# Patient Record
Sex: MALE | Race: WHITE | NOT HISPANIC OR LATINO
[De-identification: names, ages, dates, MRNs, and addresses within clinical notes are randomized per-mention and may not be internally consistent; named-entity substitution may affect disease eponyms.]

---

## 2019-10-18 PROBLEM — Z00.00 ENCOUNTER FOR PREVENTIVE HEALTH EXAMINATION: Status: ACTIVE | Noted: 2019-10-18

## 2019-12-10 ENCOUNTER — NON-APPOINTMENT (OUTPATIENT)
Age: 74
End: 2019-12-10

## 2019-12-10 ENCOUNTER — APPOINTMENT (OUTPATIENT)
Dept: CARDIOLOGY | Facility: CLINIC | Age: 74
End: 2019-12-10
Payer: MEDICARE

## 2019-12-10 VITALS
WEIGHT: 205 LBS | OXYGEN SATURATION: 96 % | HEIGHT: 68 IN | SYSTOLIC BLOOD PRESSURE: 140 MMHG | BODY MASS INDEX: 31.07 KG/M2 | HEART RATE: 46 BPM | DIASTOLIC BLOOD PRESSURE: 60 MMHG

## 2019-12-10 DIAGNOSIS — R42 DIZZINESS AND GIDDINESS: ICD-10-CM

## 2019-12-10 DIAGNOSIS — Z78.9 OTHER SPECIFIED HEALTH STATUS: ICD-10-CM

## 2019-12-10 DIAGNOSIS — Z82.49 FAMILY HISTORY OF ISCHEMIC HEART DISEASE AND OTHER DISEASES OF THE CIRCULATORY SYSTEM: ICD-10-CM

## 2019-12-10 DIAGNOSIS — Z87.891 PERSONAL HISTORY OF NICOTINE DEPENDENCE: ICD-10-CM

## 2019-12-10 DIAGNOSIS — Z95.5 PRESENCE OF CORONARY ANGIOPLASTY IMPLANT AND GRAFT: ICD-10-CM

## 2019-12-10 PROCEDURE — 93000 ELECTROCARDIOGRAM COMPLETE: CPT

## 2019-12-10 PROCEDURE — 99204 OFFICE O/P NEW MOD 45 MIN: CPT | Mod: 25

## 2019-12-10 NOTE — PHYSICAL EXAM
[General Appearance - Well Developed] : well developed [Normal Appearance] : normal appearance [Well Groomed] : well groomed [General Appearance - Well Nourished] : well nourished [No Deformities] : no deformities [Normal Conjunctiva] : the conjunctiva exhibited no abnormalities [General Appearance - In No Acute Distress] : no acute distress [Normal Oral Mucosa] : normal oral mucosa [Normal Oropharynx] : normal oropharynx [Normal Jugular Venous V Waves Present] : normal jugular venous V waves present [Heart Rate And Rhythm] : heart rate and rhythm were normal [Heart Sounds] : normal S1 and S2 [Arterial Pulses Normal] : the arterial pulses were normal [Edema] : no peripheral edema present [Respiration, Rhythm And Depth] : normal respiratory rhythm and effort [Exaggerated Use Of Accessory Muscles For Inspiration] : no accessory muscle use [Auscultation Breath Sounds / Voice Sounds] : lungs were clear to auscultation bilaterally [Chest Palpation] : palpation of the chest revealed no abnormalities [Lungs Percussion] : the lungs were normal to percussion [Bowel Sounds] : normal bowel sounds [Abdomen Soft] : soft [Abdomen Tenderness] : non-tender [Abdomen Hernia] : no hernia was discovered [Abdomen Mass (___ Cm)] : no abdominal mass palpated [Abnormal Walk] : normal gait [Cyanosis, Localized] : no localized cyanosis [Nail Clubbing] : no clubbing of the fingernails [Skin Turgor] : normal skin turgor [] : no rash [Skin Color & Pigmentation] : normal skin color and pigmentation [Oriented To Time, Place, And Person] : oriented to person, place, and time [Impaired Insight] : insight and judgment were intact [No Anxiety] : not feeling anxious [FreeTextEntry1] : LEONEL 2/6 at the Ao, preserved S2

## 2019-12-10 NOTE — DISCUSSION/SUMMARY
[FreeTextEntry1] : Mr. PARKER HERNANDEZ is a 74 year male with known CAD. Underwent PCI to the RCA in 2002.\par At the present time he is C/O effort related SOB and possible chest pressure.  Had a nuclear stress test that revealed non reversible ischemia in the lateral wall. We discussed the options of doing a cardiac cath. \par I have recommended to continue the same medications.\par We have reviewed his routine laboratory tests\par Routine follow up in 6 months\par

## 2019-12-10 NOTE — REVIEW OF SYSTEMS
[Shortness Of Breath] : shortness of breath [Dyspnea on exertion] : dyspnea during exertion [Palpitations] : palpitations [Nocturia] : nocturia [Skin: A Rash] : rash: [Anxiety] : anxiety [Negative] : Heme/Lymph [Chest Pain] : no chest pain

## 2019-12-10 NOTE — HISTORY OF PRESENT ILLNESS
[FreeTextEntry1] : 75 yo man, , father of two. Retired . In 2002 had a cardiac cath and underwent PCI to the RCA at [facility]. No evidence of past MI. \par In 5/17/2019 underwent a routine nuclear stress test that revealed an EF=51% there was a lateral perfusion abnormality consistent with scar. Has C/O of effort related SOB, however states that he has COPD with nodules in his lung. Occasional chest pressure, not always effort related. NYHA 2/4. Occasional orthopnea? Palpitations? No ankle swelling. \par HTN treated with meds since 2002\par Prediabetes treated with diet \par HLD treated with statin .LDL 50 mg/dl`\par Hypothyroidism for the last 3 years\par Known lung nodules under follow up for the last 3 years\par S/P Rt inguinal hernia repair\par S/P Rt shoulder surgery\par Quit smoking 35 years ago\par Occasional alcohol\par Denies the use of illicit drugs

## 2019-12-10 NOTE — REASON FOR VISIT
[Initial Evaluation] : an initial evaluation of [Spouse] : spouse [FreeTextEntry1] : Had a positive nuclear stress test

## 2019-12-10 NOTE — ASSESSMENT
[FreeTextEntry1] : ECG performed today at the office revealed a NSR, with normal AQRS, GA, QRS and QTc.\par

## 2020-01-28 ENCOUNTER — NON-APPOINTMENT (OUTPATIENT)
Age: 75
End: 2020-01-28

## 2020-01-28 ENCOUNTER — APPOINTMENT (OUTPATIENT)
Dept: CARDIOLOGY | Facility: CLINIC | Age: 75
End: 2020-01-28
Payer: MEDICARE

## 2020-01-28 VITALS
HEIGHT: 67.75 IN | OXYGEN SATURATION: 98 % | BODY MASS INDEX: 32.2 KG/M2 | SYSTOLIC BLOOD PRESSURE: 130 MMHG | DIASTOLIC BLOOD PRESSURE: 71 MMHG | WEIGHT: 210 LBS | HEART RATE: 65 BPM

## 2020-01-28 DIAGNOSIS — R91.8 OTHER NONSPECIFIC ABNORMAL FINDING OF LUNG FIELD: ICD-10-CM

## 2020-01-28 PROCEDURE — 99214 OFFICE O/P EST MOD 30 MIN: CPT | Mod: 24

## 2020-01-28 PROCEDURE — 93000 ELECTROCARDIOGRAM COMPLETE: CPT

## 2020-01-28 RX ORDER — AMLODIPINE BESYLATE 5 MG/1
5 TABLET ORAL DAILY
Qty: 90 | Refills: 3 | Status: ACTIVE | COMMUNITY

## 2020-01-28 RX ORDER — CHOLECALCIFEROL (VITAMIN D3) 25 MCG
TABLET ORAL
Refills: 0 | Status: ACTIVE | COMMUNITY

## 2020-01-28 RX ORDER — MOMETASONE FUROATE MONOHYDRATE 50 UG/1
SPRAY, METERED NASAL
Refills: 0 | Status: ACTIVE | COMMUNITY

## 2020-01-28 RX ORDER — UBIDECARENONE 200 MG
200 CAPSULE ORAL
Refills: 0 | Status: ACTIVE | COMMUNITY

## 2020-01-28 RX ORDER — TRAVOPROST (BENZALKONIUM) 0.004 %
0 DROPS OPHTHALMIC (EYE)
Refills: 0 | Status: ACTIVE | COMMUNITY

## 2020-01-28 RX ORDER — LEVOTHYROXINE SODIUM 0.05 MG/1
50 TABLET ORAL
Refills: 0 | Status: ACTIVE | COMMUNITY

## 2020-01-28 RX ORDER — PANTOPRAZOLE 40 MG/1
40 TABLET, DELAYED RELEASE ORAL
Refills: 0 | Status: ACTIVE | COMMUNITY

## 2020-01-28 RX ORDER — DIPHENOXYLATE HYDROCHLORIDE AND ATROPINE SULFATE 2.5; .025 MG/1; MG/1
TABLET ORAL
Refills: 0 | Status: ACTIVE | COMMUNITY

## 2020-01-28 NOTE — HISTORY OF PRESENT ILLNESS
[FreeTextEntry1] : 75 yo man, , father of two. Retired . In 2002 had a cardiac cath and underwent PCI to the RCA at [facility]. No evidence of past MI. \par In 5/17/2019 underwent a routine nuclear stress test that revealed an EF=51% there was a lateral perfusion abnormality consistent with scar. Has C/O effort related SOB, however states that he has COPD with nodules in his lung. Has to review all the scan with his PCP and lung Dr. Occasional chest pressure, not always effort related. NYHA 2/4. Limited ambulation because of knee problems. Occasional orthopnea? Palpitations? No ankle swelling. \par HTN treated with meds since 2002\par Prediabetes treated with diet \par HLD treated with statin .LDL 50 mg/dl`\par Hypothyroidism for the last 3 years\par Known lung nodules under follow up for the last 3 years. No biopsy was done yet. No change in the last 3 years. \par S/P Rt inguinal hernia repair\par S/P Rt shoulder surgery\par Quit smoking 35 years ago\par Occasional alcohol\par Denies the use of illicit drugs

## 2020-01-28 NOTE — REASON FOR VISIT
[Follow-Up - Clinic] : a clinic follow-up of [Spouse] : spouse [FreeTextEntry1] : Follow up for positive nuclear stress test

## 2020-01-28 NOTE — DISCUSSION/SUMMARY
[FreeTextEntry1] : Mr. PARKER HERNANDEZ is a 74 year male with known CAD. Underwent PCI to the RCA in 2002.\par At the present time he is C/O effort related SOB and possible chest pressure. Had a nuclear stress test in 5/2019 that revealed a medium sized area of moderate severity non reversible ischemia in the basal lateral wall with minimal reversibility. We discussed the options of doing a cardiac cath and now he has agreed. \par I have recommended to continue the same medications.\par We have reviewed his routine laboratory tests\par Routine follow up in 6 months\par

## 2020-01-28 NOTE — REVIEW OF SYSTEMS
[Shortness Of Breath] : shortness of breath [Dyspnea on exertion] : dyspnea during exertion [Nocturia] : nocturia [Skin: A Rash] : rash: [Anxiety] : anxiety [Negative] : Endocrine [Joint Pain] : joint pain [Chest Pain] : no chest pain [Palpitations] : no palpitations

## 2020-01-28 NOTE — PHYSICAL EXAM
[General Appearance - Well Developed] : well developed [Normal Appearance] : normal appearance [Well Groomed] : well groomed [General Appearance - Well Nourished] : well nourished [No Deformities] : no deformities [Normal Conjunctiva] : the conjunctiva exhibited no abnormalities [General Appearance - In No Acute Distress] : no acute distress [Normal Oral Mucosa] : normal oral mucosa [Normal Oropharynx] : normal oropharynx [Normal Jugular Venous V Waves Present] : normal jugular venous V waves present [Exaggerated Use Of Accessory Muscles For Inspiration] : no accessory muscle use [Respiration, Rhythm And Depth] : normal respiratory rhythm and effort [Auscultation Breath Sounds / Voice Sounds] : lungs were clear to auscultation bilaterally [Chest Palpation] : palpation of the chest revealed no abnormalities [Heart Rate And Rhythm] : heart rate and rhythm were normal [Lungs Percussion] : the lungs were normal to percussion [Arterial Pulses Normal] : the arterial pulses were normal [Heart Sounds] : normal S1 and S2 [Edema] : no peripheral edema present [Bowel Sounds] : normal bowel sounds [Abdomen Tenderness] : non-tender [Abdomen Soft] : soft [Abdomen Mass (___ Cm)] : no abdominal mass palpated [Abnormal Walk] : normal gait [Abdomen Hernia] : no hernia was discovered [Cyanosis, Localized] : no localized cyanosis [Nail Clubbing] : no clubbing of the fingernails [Skin Color & Pigmentation] : normal skin color and pigmentation [Skin Turgor] : normal skin turgor [] : no rash [Oriented To Time, Place, And Person] : oriented to person, place, and time [No Anxiety] : not feeling anxious [Impaired Insight] : insight and judgment were intact [FreeTextEntry1] : LEONEL 2/6 at the Ao, preserved S2

## 2020-01-28 NOTE — ASSESSMENT
[FreeTextEntry1] : ECG performed today at the office revealed a NSR 65, with normal AQRS, MO, QRS and QTc. Trigeminy and poor R progression in the precordial leads\par

## 2020-02-13 ENCOUNTER — TRANSCRIPTION ENCOUNTER (OUTPATIENT)
Age: 75
End: 2020-02-13

## 2020-02-13 ENCOUNTER — OUTPATIENT (OUTPATIENT)
Dept: OUTPATIENT SERVICES | Facility: HOSPITAL | Age: 75
LOS: 1 days | End: 2020-02-13
Payer: MEDICARE

## 2020-02-13 VITALS
OXYGEN SATURATION: 97 % | SYSTOLIC BLOOD PRESSURE: 144 MMHG | HEART RATE: 68 BPM | RESPIRATION RATE: 16 BRPM | DIASTOLIC BLOOD PRESSURE: 67 MMHG

## 2020-02-13 VITALS
SYSTOLIC BLOOD PRESSURE: 146 MMHG | RESPIRATION RATE: 17 BRPM | TEMPERATURE: 98 F | OXYGEN SATURATION: 97 % | DIASTOLIC BLOOD PRESSURE: 82 MMHG | HEART RATE: 70 BPM

## 2020-02-13 DIAGNOSIS — I25.10 ATHEROSCLEROTIC HEART DISEASE OF NATIVE CORONARY ARTERY WITHOUT ANGINA PECTORIS: ICD-10-CM

## 2020-02-13 LAB
ANION GAP SERPL CALC-SCNC: 14 MMOL/L — SIGNIFICANT CHANGE UP (ref 5–17)
APTT BLD: 30.2 SEC — SIGNIFICANT CHANGE UP (ref 27.5–36.3)
BUN SERPL-MCNC: 17 MG/DL — SIGNIFICANT CHANGE UP (ref 8–20)
CALCIUM SERPL-MCNC: 10 MG/DL — SIGNIFICANT CHANGE UP (ref 8.6–10.2)
CHLORIDE SERPL-SCNC: 103 MMOL/L — SIGNIFICANT CHANGE UP (ref 98–107)
CO2 SERPL-SCNC: 24 MMOL/L — SIGNIFICANT CHANGE UP (ref 22–29)
CREAT SERPL-MCNC: 0.94 MG/DL — SIGNIFICANT CHANGE UP (ref 0.5–1.3)
GLUCOSE SERPL-MCNC: 113 MG/DL — HIGH (ref 70–99)
HCT VFR BLD CALC: 43.1 % — SIGNIFICANT CHANGE UP (ref 39–50)
HGB BLD-MCNC: 14.2 G/DL — SIGNIFICANT CHANGE UP (ref 13–17)
INR BLD: 1.11 RATIO — SIGNIFICANT CHANGE UP (ref 0.88–1.16)
MCHC RBC-ENTMCNC: 29.6 PG — SIGNIFICANT CHANGE UP (ref 27–34)
MCHC RBC-ENTMCNC: 32.9 GM/DL — SIGNIFICANT CHANGE UP (ref 32–36)
MCV RBC AUTO: 89.8 FL — SIGNIFICANT CHANGE UP (ref 80–100)
PLATELET # BLD AUTO: 173 K/UL — SIGNIFICANT CHANGE UP (ref 150–400)
POTASSIUM SERPL-MCNC: 4.2 MMOL/L — SIGNIFICANT CHANGE UP (ref 3.5–5.3)
POTASSIUM SERPL-SCNC: 4.2 MMOL/L — SIGNIFICANT CHANGE UP (ref 3.5–5.3)
PROTHROM AB SERPL-ACNC: 12.6 SEC — SIGNIFICANT CHANGE UP (ref 10–12.9)
RBC # BLD: 4.8 M/UL — SIGNIFICANT CHANGE UP (ref 4.2–5.8)
RBC # FLD: 13 % — SIGNIFICANT CHANGE UP (ref 10.3–14.5)
SODIUM SERPL-SCNC: 141 MMOL/L — SIGNIFICANT CHANGE UP (ref 135–145)
WBC # BLD: 5.7 K/UL — SIGNIFICANT CHANGE UP (ref 3.8–10.5)
WBC # FLD AUTO: 5.7 K/UL — SIGNIFICANT CHANGE UP (ref 3.8–10.5)

## 2020-02-13 PROCEDURE — 99153 MOD SED SAME PHYS/QHP EA: CPT

## 2020-02-13 PROCEDURE — 93458 L HRT ARTERY/VENTRICLE ANGIO: CPT

## 2020-02-13 PROCEDURE — 93571 IV DOP VEL&/PRESS C FLO 1ST: CPT | Mod: 26,LD

## 2020-02-13 PROCEDURE — 85610 PROTHROMBIN TIME: CPT

## 2020-02-13 PROCEDURE — C1894: CPT

## 2020-02-13 PROCEDURE — 93010 ELECTROCARDIOGRAM REPORT: CPT

## 2020-02-13 PROCEDURE — 85027 COMPLETE CBC AUTOMATED: CPT

## 2020-02-13 PROCEDURE — 93458 L HRT ARTERY/VENTRICLE ANGIO: CPT | Mod: 26

## 2020-02-13 PROCEDURE — C1887: CPT

## 2020-02-13 PROCEDURE — 93005 ELECTROCARDIOGRAM TRACING: CPT

## 2020-02-13 PROCEDURE — C1769: CPT

## 2020-02-13 PROCEDURE — 99152 MOD SED SAME PHYS/QHP 5/>YRS: CPT

## 2020-02-13 PROCEDURE — 93571 IV DOP VEL&/PRESS C FLO 1ST: CPT | Mod: LD

## 2020-02-13 PROCEDURE — 80048 BASIC METABOLIC PNL TOTAL CA: CPT

## 2020-02-13 PROCEDURE — 36415 COLL VENOUS BLD VENIPUNCTURE: CPT

## 2020-02-13 PROCEDURE — 85730 THROMBOPLASTIN TIME PARTIAL: CPT

## 2020-02-13 RX ORDER — TRAVOPROST 0.04 MG/ML
1 SOLUTION/ DROPS OPHTHALMIC
Qty: 0 | Refills: 0 | DISCHARGE

## 2020-02-13 RX ORDER — AMLODIPINE BESYLATE 2.5 MG/1
1 TABLET ORAL
Qty: 0 | Refills: 0 | DISCHARGE

## 2020-02-13 RX ORDER — DIPHENOXYLATE HCL/ATROPINE 2.5-.025MG
2 TABLET ORAL
Qty: 0 | Refills: 0 | DISCHARGE

## 2020-02-13 RX ORDER — FLUOCINONIDE/EMOLLIENT BASE 0.05 %
1 CREAM (GRAM) TOPICAL
Qty: 0 | Refills: 0 | DISCHARGE

## 2020-02-13 RX ORDER — LEVOTHYROXINE SODIUM 125 MCG
1 TABLET ORAL
Qty: 0 | Refills: 0 | DISCHARGE

## 2020-02-13 RX ORDER — FLUOCINOLONE ACETONIDE 0.11 MG/20ML
4 OIL AURICULAR (OTIC)
Qty: 0 | Refills: 0 | DISCHARGE

## 2020-02-13 RX ORDER — MOMETASONE FUROATE 50 UG/1
2 SPRAY NASAL
Qty: 0 | Refills: 0 | DISCHARGE

## 2020-02-13 RX ORDER — ALPRAZOLAM 0.25 MG
1 TABLET ORAL
Qty: 0 | Refills: 0 | DISCHARGE

## 2020-02-13 RX ORDER — ASPIRIN/CALCIUM CARB/MAGNESIUM 324 MG
1 TABLET ORAL
Qty: 0 | Refills: 0 | DISCHARGE

## 2020-02-13 RX ORDER — PANTOPRAZOLE SODIUM 20 MG/1
1 TABLET, DELAYED RELEASE ORAL
Qty: 0 | Refills: 0 | DISCHARGE

## 2020-02-13 RX ORDER — CHOLECALCIFEROL (VITAMIN D3) 125 MCG
1 CAPSULE ORAL
Qty: 0 | Refills: 0 | DISCHARGE

## 2020-02-13 RX ORDER — UBIDECARENONE 100 MG
200 CAPSULE ORAL
Qty: 0 | Refills: 0 | DISCHARGE

## 2020-02-13 RX ORDER — MONTELUKAST 4 MG/1
1 TABLET, CHEWABLE ORAL
Qty: 0 | Refills: 0 | DISCHARGE

## 2020-02-13 RX ORDER — LOSARTAN POTASSIUM 100 MG/1
1 TABLET, FILM COATED ORAL
Qty: 0 | Refills: 0 | DISCHARGE

## 2020-02-13 RX ORDER — ATORVASTATIN CALCIUM 80 MG/1
1 TABLET, FILM COATED ORAL
Qty: 0 | Refills: 0 | DISCHARGE

## 2020-02-13 NOTE — DISCHARGE NOTE PROVIDER - CARE PROVIDER_API CALL
Jay Gruber)  Cardiovascular Disease; Internal Medicine; Interventional Cardiology  Phone: [phone]  Fax: [phone]  Follow Up Time:

## 2020-02-13 NOTE — DISCHARGE NOTE PROVIDER - HOSPITAL COURSE
s/p LHC revealing non obstructive CAD    patent RCA stent     Non-obstructive lesions in LAD     Patient feels well.  Denies chest pain, shortness of breath, dizziness or palpitations at this time        Right radial hemo band in place.  Procedure site CDI, no bleeding, no hematoma, able to move all digits with capillary refill < 3 seconds, fingers warm            HPI:    This is a 74 year old male  former smoker HTN HL CAD 2002 to RCA . Pt presented to cardiologist with progressive SOB and chest pressure     NST  was abnormal  revealed a medium sized area of moderate severity non reversible ischemia in the basal lateral wall  with minimal reversible    Pt referred for Cardiac cath and possible intervention     other pertinent history : LOIDA untreated , hypothyroidism MVP (13 Feb 2020 12:26)    now s/p LHC revealing non obstructive CAD        -vital signs, labs, diet and activity per post procedure orders    - Continue current medications     - Wrist precautions     -ambulate ad kal post bedrest    -encourage PO fluids    -plan of care discussed with patient, family and MD     -post procedure and d/c instructions reviewed    -follow up with MD in 1-2 weeks    -Discussed therapeutic lifestyle changes to reduce risk factors such as following a cardiac diet, weight loss, maintaining a healthy weight, exercise, smoking cessation, medication compliance, and regular follow-up  with MD to know your numbers (BP, cholesterol, weight, and glucose)

## 2020-02-13 NOTE — DISCHARGE NOTE NURSING/CASE MANAGEMENT/SOCIAL WORK - PATIENT PORTAL LINK FT
You can access the FollowMyHealth Patient Portal offered by [facility] by registering at the following website: [email or web address]. By joining [employer]’s FollowMyHealth portal, you will also be able to view your health information using other applications (apps) compatible with our system.

## 2020-02-13 NOTE — H&P PST ADULT - HISTORY OF PRESENT ILLNESS
This is a 74 year old male  former smoker HTN HL CAD 2002 to RCA . Pt presented to cardiologist with progressive SOB and chest pressure   NST  was abnormal  with reversible ischemia in the lateral wall .   Pt referred for Cardiac cath and possible intervention   Mallampati   ASA   BRA   GFR     NST This is a 74 year old male  former smoker HTN HL CAD 2002 to RCA . Pt presented to cardiologist with progressive SOB and chest pressure   NST  was abnormal  revealed a medium sized area of moderate severity non reversible ischemia in the basal lateral wall  with minimal reversible  Pt referred for Cardiac cath and possible intervention   Mallampati 2  ASA 2  BRA   GFR     other pertinent history : LOIDA untreated , hypothyroidism MVP

## 2020-02-13 NOTE — DISCHARGE NOTE PROVIDER - NSDCMRMEDTOKEN_GEN_ALL_CORE_FT
ALPRAZolam 0.5 mg oral tablet, disintegratin tab(s) orally 3 times a day  amLODIPine 5 mg oral tablet: 1 tab(s) orally once a day  aspirin 81 mg oral tablet: 1 tab(s) orally once a day  Coenzyme Q10 10 mg oral capsule: 200 milligram(s) orally once a day  fluocinolone 0.01% otic solution: 4 drop(s) to each affected ear 2 times a day, As Needed  levothyroxine 50 mcg (0.05 mg) oral tablet: 1 tab(s) orally once a day  Librax 5 mg-2.5 mg oral capsule: 2 cap(s) orally 4 times a day (before meals and at bedtime)  Lidex 0.05% topical cream: Apply topically to affected area 3 times a day, As Needed  Lipitor 10 mg oral tablet: 1 tab(s) orally once a day  Lomotil 2.5 mg-0.025 mg oral tablet: 2 tab(s) orally 4 times a day  losartan 25 mg oral tablet: 1 tab(s) orally once a day  montelukast 10 mg oral tablet: 1 tab(s) orally once a day  Nasonex 50 mcg/inh nasal spray: 2 spray(s) nasal once a day  pantoprazole 40 mg oral delayed release tablet: 1 tab(s) orally once a day  travoprost 0.004% ophthalmic solution: 1 drop(s) to each affected eye once a day (in the morning)  triamcinolone 0.1% topical cream: Apply topically to affected area 2 times a day  Vitamin D3 2000 intl units (50 mcg) oral tablet: 1 tab(s) orally once a day

## 2020-02-13 NOTE — DISCHARGE NOTE PROVIDER - NSDCCPCAREPLAN_GEN_ALL_CORE_FT
PRINCIPAL DISCHARGE DIAGNOSIS  Diagnosis: S/P cardiac catheterization  Assessment and Plan of Treatment: Wrist precautions  Follow up with cardiologist

## 2020-02-13 NOTE — H&P PST ADULT - ASSESSMENT
74 year old male with HTN HL CAD 2002 with RCA stents . Pt presented to cardiologist with c/o SOB with exertion and intermittent Chest pain . He had a positive NST   He was referred for Cardiac cath   PRE-PROCEDURE ASSESSMENT  LHC and possible intevention   -Patient seen and examined  -Labs reviewed  -Pre-procedure teaching completed with patient and family  -Informed consent witnessed  -Questions answered

## 2020-03-03 ENCOUNTER — APPOINTMENT (OUTPATIENT)
Dept: CARDIOLOGY | Facility: CLINIC | Age: 75
End: 2020-03-03
Payer: MEDICARE

## 2020-03-03 ENCOUNTER — NON-APPOINTMENT (OUTPATIENT)
Age: 75
End: 2020-03-03

## 2020-03-03 VITALS
OXYGEN SATURATION: 97 % | DIASTOLIC BLOOD PRESSURE: 74 MMHG | BODY MASS INDEX: 32.81 KG/M2 | HEART RATE: 52 BPM | WEIGHT: 214 LBS | SYSTOLIC BLOOD PRESSURE: 114 MMHG | HEIGHT: 67.75 IN

## 2020-03-03 DIAGNOSIS — R06.02 SHORTNESS OF BREATH: ICD-10-CM

## 2020-03-03 PROCEDURE — 93000 ELECTROCARDIOGRAM COMPLETE: CPT

## 2020-03-03 PROCEDURE — 99214 OFFICE O/P EST MOD 30 MIN: CPT | Mod: 24

## 2020-03-03 RX ORDER — ALPRAZOLAM 0.5 MG/1
0.5 TABLET ORAL
Refills: 0 | Status: ACTIVE | COMMUNITY

## 2020-03-03 RX ORDER — LOSARTAN POTASSIUM 25 MG/1
25 TABLET, FILM COATED ORAL DAILY
Qty: 90 | Refills: 1 | Status: DISCONTINUED | COMMUNITY
End: 2020-03-03

## 2020-03-03 RX ORDER — CHLORDIAZEPOXIDE HYDROCHLORIDE AND CLIDINIUM BROMIDE 5; 2.5 MG/1; MG/1
5-2.5 CAPSULE ORAL EVERY 6 HOURS
Refills: 0 | Status: ACTIVE | COMMUNITY
Start: 2020-03-03

## 2020-03-03 NOTE — ASSESSMENT
[FreeTextEntry1] : ECG performed today at the office revealed a NSR 65, with normal AQRS, WA, QRS and QTc. Trigeminy and poor R progression in the precordial leads\par

## 2020-03-03 NOTE — PHYSICAL EXAM
[General Appearance - Well Developed] : well developed [Normal Appearance] : normal appearance [Well Groomed] : well groomed [General Appearance - Well Nourished] : well nourished [No Deformities] : no deformities [General Appearance - In No Acute Distress] : no acute distress [Normal Conjunctiva] : the conjunctiva exhibited no abnormalities [Normal Oral Mucosa] : normal oral mucosa [Normal Oropharynx] : normal oropharynx [Normal Jugular Venous V Waves Present] : normal jugular venous V waves present [Respiration, Rhythm And Depth] : normal respiratory rhythm and effort [Exaggerated Use Of Accessory Muscles For Inspiration] : no accessory muscle use [Auscultation Breath Sounds / Voice Sounds] : lungs were clear to auscultation bilaterally [Chest Palpation] : palpation of the chest revealed no abnormalities [Lungs Percussion] : the lungs were normal to percussion [Heart Sounds] : normal S1 and S2 [Heart Rate And Rhythm] : heart rate and rhythm were normal [Arterial Pulses Normal] : the arterial pulses were normal [Edema] : no peripheral edema present [Abdomen Soft] : soft [Bowel Sounds] : normal bowel sounds [Abdomen Tenderness] : non-tender [Abdomen Mass (___ Cm)] : no abdominal mass palpated [Abdomen Hernia] : no hernia was discovered [Abnormal Walk] : normal gait [Nail Clubbing] : no clubbing of the fingernails [Skin Color & Pigmentation] : normal skin color and pigmentation [Cyanosis, Localized] : no localized cyanosis [] : no rash [Skin Turgor] : normal skin turgor [Impaired Insight] : insight and judgment were intact [Oriented To Time, Place, And Person] : oriented to person, place, and time [No Anxiety] : not feeling anxious [FreeTextEntry1] : LEONEL 2/6 at the Ao, preserved S2

## 2020-03-03 NOTE — DISCUSSION/SUMMARY
[FreeTextEntry1] : Mr. PARKER HERNANDEZ is a 74 year male with known CAD. Underwent PCI to the RCA in 2002.\par At the present time he is C/O effort related SOB. Had a cardiac cath in 2/2020 that revealed non obstructive CAD. Normal iFR. No intervention was performed. However LV function was reduced 35-40% and with his SOB, I have decided to stop the losartan and start Entresto BID.\par Will consider stopping the amlodipine if the BP is low.\par We have reviewed his routine laboratory tests\par Routine follow up in 6 months\par

## 2020-03-03 NOTE — REVIEW OF SYSTEMS
[Shortness Of Breath] : shortness of breath [Dyspnea on exertion] : dyspnea during exertion [Nocturia] : nocturia [Joint Pain] : joint pain [Skin: A Rash] : rash: [Anxiety] : anxiety [Negative] : Heme/Lymph [Chest Pain] : no chest pain [Palpitations] : no palpitations

## 2020-03-03 NOTE — HISTORY OF PRESENT ILLNESS
[FreeTextEntry1] : 75 yo man, , father of two. Retired . In 2002 had a cardiac cath and underwent PCI to the RCA at [facility]. No evidence of past MI. \par In 5/17/2019 underwent a routine nuclear stress test that revealed an EF=51% there was a lateral perfusion abnormality consistent with scar. Has C/O effort related SOB, however states that he has COPD with nodules in his lung. Has to review all the scan with his PCP and lung Dr. Occasional chest pressure, not always effort related. \par C/O effort related SOB NYHA 2/4. Limited ambulation because of knee problems. Occasional orthopnea? Palpitations? No ankle swelling. \par On 2/13/2020 underwent cardiac catheterization at [facility] that revealed a moderate stenosis in the LAD and Diag (normal iFR) and reduced LVEF (35-40%). Treated medically. \par HTN treated with meds since 2002\par Prediabetes treated with diet \par HLD treated with statin .LDL 50 mg/dl`\par Hypothyroidism for the last 3 years\par Known lung nodules under follow up for the last 3 years. No biopsy was done yet. No change in the last 3 years. \par S/P Rt inguinal hernia repair\par S/P Rt shoulder surgery\par Quit smoking 35 years ago\par Occasional alcohol\par Denies the use of illicit drugs

## 2020-03-12 ENCOUNTER — APPOINTMENT (OUTPATIENT)
Dept: CARDIOLOGY | Facility: CLINIC | Age: 75
End: 2020-03-12
Payer: MEDICARE

## 2020-03-30 ENCOUNTER — NON-APPOINTMENT (OUTPATIENT)
Age: 75
End: 2020-03-30

## 2020-04-14 ENCOUNTER — APPOINTMENT (OUTPATIENT)
Dept: CARDIOLOGY | Facility: CLINIC | Age: 75
End: 2020-04-14

## 2020-04-21 PROCEDURE — 93224 XTRNL ECG REC UP TO 48 HRS: CPT

## 2020-09-22 ENCOUNTER — APPOINTMENT (OUTPATIENT)
Dept: CARDIOLOGY | Facility: CLINIC | Age: 75
End: 2020-09-22
Payer: MEDICARE

## 2020-09-22 ENCOUNTER — NON-APPOINTMENT (OUTPATIENT)
Age: 75
End: 2020-09-22

## 2020-09-22 VITALS
DIASTOLIC BLOOD PRESSURE: 71 MMHG | WEIGHT: 208 LBS | SYSTOLIC BLOOD PRESSURE: 138 MMHG | BODY MASS INDEX: 32.27 KG/M2 | HEIGHT: 67.5 IN | HEART RATE: 51 BPM

## 2020-09-22 DIAGNOSIS — F41.9 ANXIETY DISORDER, UNSPECIFIED: ICD-10-CM

## 2020-09-22 DIAGNOSIS — E03.9 HYPOTHYROIDISM, UNSPECIFIED: ICD-10-CM

## 2020-09-22 DIAGNOSIS — I49.3 VENTRICULAR PREMATURE DEPOLARIZATION: ICD-10-CM

## 2020-09-22 PROCEDURE — 99215 OFFICE O/P EST HI 40 MIN: CPT | Mod: 24

## 2020-09-22 PROCEDURE — 93000 ELECTROCARDIOGRAM COMPLETE: CPT

## 2020-09-22 NOTE — PHYSICAL EXAM
[General Appearance - Well Developed] : well developed [Normal Appearance] : normal appearance [Well Groomed] : well groomed [General Appearance - Well Nourished] : well nourished [No Deformities] : no deformities [General Appearance - In No Acute Distress] : no acute distress [Normal Conjunctiva] : the conjunctiva exhibited no abnormalities [Normal Jugular Venous V Waves Present] : normal jugular venous V waves present [Respiration, Rhythm And Depth] : normal respiratory rhythm and effort [Exaggerated Use Of Accessory Muscles For Inspiration] : no accessory muscle use [Auscultation Breath Sounds / Voice Sounds] : lungs were clear to auscultation bilaterally [Chest Palpation] : palpation of the chest revealed no abnormalities [Lungs Percussion] : the lungs were normal to percussion [Heart Rate And Rhythm] : heart rate and rhythm were normal [Heart Sounds] : normal S1 and S2 [Arterial Pulses Normal] : the arterial pulses were normal [Bowel Sounds] : normal bowel sounds [Abdomen Soft] : soft [Abdomen Tenderness] : non-tender [Abdomen Mass (___ Cm)] : no abdominal mass palpated [Abdomen Hernia] : no hernia was discovered [Abnormal Walk] : normal gait [Nail Clubbing] : no clubbing of the fingernails [Cyanosis, Localized] : no localized cyanosis [Skin Color & Pigmentation] : normal skin color and pigmentation [Skin Turgor] : normal skin turgor [] : no rash [Oriented To Time, Place, And Person] : oriented to person, place, and time [Impaired Insight] : insight and judgment were intact [No Anxiety] : not feeling anxious [FreeTextEntry1] : LEONEL 2/6 at the Ao, preserved S2. Edema +1 BE

## 2020-09-22 NOTE — REVIEW OF SYSTEMS
[Shortness Of Breath] : shortness of breath [Dyspnea on exertion] : dyspnea during exertion [Nocturia] : nocturia [Joint Pain] : joint pain [Skin: A Rash] : rash: [Anxiety] : anxiety [Negative] : Heme/Lymph [Nausea] : nausea [Heartburn] : heartburn [Chest Pain] : no chest pain [Palpitations] : no palpitations

## 2020-09-22 NOTE — ASSESSMENT
[FreeTextEntry1] : ECG performed today at the office revealed a NSR 65, with normal AQRS, LA, QRS and QTc. Trigeminy and poor R progression in the precordial leads\par

## 2020-09-22 NOTE — DISCUSSION/SUMMARY
[FreeTextEntry1] : Mr. PARKER HERNANDEZ is a 75 year male with known CAD. Underwent PCI to the RCA in 2002. Had a cardiac cath in 2/2020 that revealed non obstructive CAD with intermediate lesions and normal iFR. Therefore, no intervention was performed. However LV function was reduced 35-40%, that was normal in the past. Holter ECG revealed multiple VPCs (23% burden in March 2020). Started on metoprolol and new Holter revealed a burden of 6% but one VT 7 beats. Asymptomatic.\par Will continue same meds\par Will repeat the echo, last one was done in 2017. \par We have reviewed his routine laboratory tests\par Routine follow up in 6 months\par

## 2020-09-22 NOTE — HISTORY OF PRESENT ILLNESS
[FreeTextEntry1] : 74 yo man, , father of two. Retired . In 2002 had a cardiac cath and underwent PCI to the RCA at [facility]. No evidence of past MI. \par In 5/17/2019 underwent a routine nuclear stress test that revealed an EF=51% there was a lateral perfusion abnormality consistent with scar. Has C/O effort related SOB, however states that he has COPD with nodules in his lung. Has to review all the scan with his PCP and lung Dr. Occasional chest pressure, not always effort related. \par C/O effort related SOB NYHA 2/4. Limited ambulation because of knee problems. Occasional orthopnea? Palpitations? No ankle swelling. \par On 2/13/2020 underwent cardiac catheterization at [facility] that revealed a moderate stenosis in the LAD and Diag (normal iFR) and reduced LVEF (35-40%). Possible related to VPC burden (23% in 3/2020)? Assessed by EP at the present time. Treated medically with Entresto and BB. \par Had a Holter ECG that revealed multiple PVCs. Started on BB and had a repeat Holter ECG that revealed multiple VPCs and one VT with 7 beats. \par C/O gas and abdominal distention. Refuses to go to a GI Dr, extremely anxious. \par HTN treated with meds since 2002\par Prediabetes treated with diet \par HLD treated with statin .LDL 50 mg/dl`\par Hypothyroidism for the last 3 years\par Known lung nodules under follow up for the last 3 years. No biopsy was done yet. No change in the last 3 years. \par S/P Rt inguinal hernia repair\par S/P Rt shoulder surgery\par Quit smoking 35 years ago\par Occasional alcohol\par Denies the use of illicit drugs

## 2020-11-10 ENCOUNTER — APPOINTMENT (OUTPATIENT)
Dept: CARDIOLOGY | Facility: CLINIC | Age: 75
End: 2020-11-10
Payer: MEDICARE

## 2020-11-10 PROCEDURE — 93306 TTE W/DOPPLER COMPLETE: CPT

## 2021-01-12 ENCOUNTER — APPOINTMENT (OUTPATIENT)
Dept: CARDIOLOGY | Facility: CLINIC | Age: 76
End: 2021-01-12

## 2021-01-28 ENCOUNTER — RX RENEWAL (OUTPATIENT)
Age: 76
End: 2021-01-28

## 2021-02-03 ENCOUNTER — RX RENEWAL (OUTPATIENT)
Age: 76
End: 2021-02-03

## 2021-02-09 ENCOUNTER — APPOINTMENT (OUTPATIENT)
Dept: CARDIOLOGY | Facility: CLINIC | Age: 76
End: 2021-02-09

## 2021-03-08 RX ORDER — ASPIRIN 81 MG
81 TABLET, DELAYED RELEASE (ENTERIC COATED) ORAL
Refills: 0 | Status: ACTIVE | COMMUNITY

## 2021-03-08 RX ORDER — MONTELUKAST 10 MG/1
10 TABLET, FILM COATED ORAL
Refills: 0 | Status: ACTIVE | COMMUNITY
Start: 2020-03-03

## 2021-03-08 RX ORDER — ATORVASTATIN CALCIUM 10 MG/1
10 TABLET, FILM COATED ORAL
Refills: 0 | Status: ACTIVE | COMMUNITY

## 2021-03-09 ENCOUNTER — APPOINTMENT (OUTPATIENT)
Dept: CARDIOLOGY | Facility: CLINIC | Age: 76
End: 2021-03-09
Payer: MEDICARE

## 2021-03-09 ENCOUNTER — NON-APPOINTMENT (OUTPATIENT)
Age: 76
End: 2021-03-09

## 2021-03-09 VITALS
BODY MASS INDEX: 32.11 KG/M2 | SYSTOLIC BLOOD PRESSURE: 121 MMHG | HEART RATE: 53 BPM | DIASTOLIC BLOOD PRESSURE: 73 MMHG | WEIGHT: 207 LBS | HEIGHT: 67.5 IN

## 2021-03-09 DIAGNOSIS — I25.10 ATHEROSCLEROTIC HEART DISEASE OF NATIVE CORONARY ARTERY W/OUT ANGINA PECTORIS: ICD-10-CM

## 2021-03-09 DIAGNOSIS — I10 ESSENTIAL (PRIMARY) HYPERTENSION: ICD-10-CM

## 2021-03-09 DIAGNOSIS — I34.1 NONRHEUMATIC MITRAL (VALVE) PROLAPSE: ICD-10-CM

## 2021-03-09 DIAGNOSIS — Z95.5 PRESENCE OF CORONARY ANGIOPLASTY IMPLANT AND GRAFT: ICD-10-CM

## 2021-03-09 DIAGNOSIS — I35.0 NONRHEUMATIC AORTIC (VALVE) STENOSIS: ICD-10-CM

## 2021-03-09 DIAGNOSIS — E78.5 HYPERLIPIDEMIA, UNSPECIFIED: ICD-10-CM

## 2021-03-09 PROCEDURE — 99213 OFFICE O/P EST LOW 20 MIN: CPT | Mod: 24

## 2021-03-09 PROCEDURE — 93000 ELECTROCARDIOGRAM COMPLETE: CPT

## 2021-03-09 NOTE — HISTORY OF PRESENT ILLNESS
[FreeTextEntry1] : 74 yo man, , father of two. Retired . In 2002 had a cardiac cath and underwent PCI to the RCA at [facility]. No evidence of past MI. \par In 5/17/2019 underwent a routine nuclear stress test that revealed an EF=51% there was a lateral perfusion abnormality consistent with scar. Has C/O effort related SOB, however states that he has COPD with nodules in his lung. Has to review all the scan with his PCP and lung Dr. Occasional chest pressure, not always effort related. \par C/O effort related SOB NYHA 2/4. Limited ambulation because of knee problems. Occasional orthopnea? Palpitations? No ankle swelling. \par On 2/13/2020 underwent cardiac catheterization at [facility] that revealed a moderate stenosis in the LAD and Diag (normal iFR) and reduced LVEF (35-40%). Possible related to VPC burden (23% in 3/2020)? Assessed by EP at the present time. Treated medically with Entresto and BB.\par An echo done in 11/2020 revealed normal LV function (LVEF=55%) with moderate Aortic Stenosis (AVG=19 mmHg and JAKE=1.3 cm2). During his cath he had a gradient of 9 mmHg. \par Had a Holter ECG that revealed multiple PVCs. Started on BB and had a repeat Holter ECG that revealed multiple VPCs and one VT with 7 beats. \par C/O gas and abdominal distention. Refuses to go to a GI Dr, extremely anxious. \par HTN treated with meds since 2002\par Prediabetes treated with diet \par HLD treated with statin. LDL 50 mg/dL. \par Hypothyroidism for the last 3 years\par Known lung nodules under follow up for the last 3 years. No biopsy was done yet. No change in the last 3 years. \par S/P Rt inguinal hernia repair\par S/P Rt shoulder surgery\par Quit smoking 35 years ago\par Occasional alcohol\par Denies the use of illicit drugs

## 2021-03-09 NOTE — DISCUSSION/SUMMARY
[FreeTextEntry1] : Mr. PARKER HERNANDEZ is a 75 year male with known CAD. Underwent PCI to the RCA in 2002. Had a cardiac cath in 2/2020 that revealed non obstructive CAD with intermediate lesions and normal iFR. Therefore, no intervention was performed. However LV function was reduced 35-40%, that was normal in the past. Holter ECG revealed multiple VPCs (23% burden in March 2020). Started on metoprolol and new Holter revealed a burden of 6% but one VT 7 beats. Since then there has been an improvement in LVEF and is normal now LVEF=55%. Also has mild to moderate AS,. Asymptomatic.\par Will continue same meds\par Will order routine blood work.\par Routine follow up in 6 months\par

## 2021-03-09 NOTE — REVIEW OF SYSTEMS
[Shortness Of Breath] : shortness of breath [Dyspnea on exertion] : dyspnea during exertion [Nausea] : nausea [Heartburn] : heartburn [Nocturia] : nocturia [Joint Pain] : joint pain [Skin: A Rash] : rash: [Anxiety] : anxiety [Negative] : Heme/Lymph [Chest Pain] : no chest pain [Palpitations] : no palpitations

## 2021-03-09 NOTE — ASSESSMENT
[FreeTextEntry1] : ECG performed today at the office revealed a NSR 71, with normal AQRS, prolonged NV (1st degree AVB), normal QRS and QTc. Trigeminy and poor R progression in the precordial leads. No change compared to prior. \par

## 2021-03-25 ENCOUNTER — NON-APPOINTMENT (OUTPATIENT)
Age: 76
End: 2021-03-25

## 2021-09-07 ENCOUNTER — APPOINTMENT (OUTPATIENT)
Dept: CARDIOLOGY | Facility: CLINIC | Age: 76
End: 2021-09-07

## 2022-01-14 ENCOUNTER — RX RENEWAL (OUTPATIENT)
Age: 77
End: 2022-01-14

## 2022-01-14 RX ORDER — SACUBITRIL AND VALSARTAN 49; 51 MG/1; MG/1
49-51 TABLET, FILM COATED ORAL TWICE DAILY
Qty: 120 | Refills: 0 | Status: ACTIVE | COMMUNITY
Start: 2020-03-03 | End: 1900-01-01

## 2022-01-20 RX ORDER — METOPROLOL SUCCINATE 25 MG/1
25 TABLET, EXTENDED RELEASE ORAL
Qty: 90 | Refills: 0 | Status: ACTIVE | COMMUNITY
Start: 2020-04-02 | End: 1900-01-01

## 2023-02-28 ENCOUNTER — RESULT REVIEW (OUTPATIENT)
Age: 78
End: 2023-02-28